# Patient Record
Sex: FEMALE | Race: BLACK OR AFRICAN AMERICAN | NOT HISPANIC OR LATINO | Employment: UNEMPLOYED | URBAN - METROPOLITAN AREA
[De-identification: names, ages, dates, MRNs, and addresses within clinical notes are randomized per-mention and may not be internally consistent; named-entity substitution may affect disease eponyms.]

---

## 2019-01-22 ENCOUNTER — HOSPITAL ENCOUNTER (OUTPATIENT)
Dept: MEDSURG UNIT | Facility: HOSPITAL | Age: 24
End: 2019-01-24
Attending: INTERNAL MEDICINE | Admitting: INTERNAL MEDICINE

## 2019-01-22 LAB
ABS NEUT (OLG): 3.25 X10(3)/MCL (ref 2.1–9.2)
ALBUMIN SERPL-MCNC: 4 GM/DL (ref 3.4–5)
ALBUMIN/GLOB SERPL: 0.91 RATIO (ref 1.1–2)
ALP SERPL-CCNC: 107 UNIT/L (ref 45–117)
ALT SERPL-CCNC: 20 UNIT/L (ref 12–78)
APPEARANCE, UA: CLEAR
AST SERPL-CCNC: 17 UNIT/L (ref 15–37)
BACTERIA #/AREA URNS AUTO: ABNORMAL /[HPF]
BASOPHILS # BLD AUTO: 0.02 X10(3)/MCL
BASOPHILS NFR BLD AUTO: 0 %
BILIRUB SERPL-MCNC: 0.4 MG/DL (ref 0.2–1)
BILIRUB UR QL STRIP: 0.5 MG/DL
BILIRUBIN DIRECT+TOT PNL SERPL-MCNC: 0.1 MG/DL
BILIRUBIN DIRECT+TOT PNL SERPL-MCNC: 0.3 MG/DL
BUN SERPL-MCNC: 12 MG/DL (ref 7–18)
CALCIUM SERPL-MCNC: 9.2 MG/DL (ref 8.5–10.1)
CHLORIDE SERPL-SCNC: 102 MMOL/L (ref 98–107)
CO2 SERPL-SCNC: 27 MMOL/L (ref 21–32)
COLOR UR: YELLOW
CREAT SERPL-MCNC: 1 MG/DL (ref 0.6–1.3)
EOSINOPHIL # BLD AUTO: 0.02 10*3/UL
EOSINOPHIL NFR BLD AUTO: 0 %
ERYTHROCYTE [DISTWIDTH] IN BLOOD BY AUTOMATED COUNT: 12.3 % (ref 11.5–14.5)
GLOBULIN SER-MCNC: 4.4 GM/ML (ref 2.3–3.5)
GLUCOSE (UA): NORMAL
GLUCOSE SERPL-MCNC: 89 MG/DL (ref 74–106)
HCT VFR BLD AUTO: 39.6 % (ref 35–46)
HGB BLD-MCNC: 12.9 GM/DL (ref 12–16)
HGB UR QL STRIP: NEGATIVE
HYALINE CASTS #/AREA URNS LPF: ABNORMAL /[LPF]
IMM GRANULOCYTES # BLD AUTO: 0.05 10*3/UL
IMM GRANULOCYTES NFR BLD AUTO: 1 %
KETONES UR QL STRIP: 20 MG/DL
LEUKOCYTE ESTERASE UR QL STRIP: 25 LEU/UL
LYMPHOCYTES # BLD AUTO: 2.07 X10(3)/MCL
LYMPHOCYTES NFR BLD AUTO: 35 % (ref 13–40)
MAGNESIUM SERPL-MCNC: 2.1 MG/DL (ref 1.8–2.4)
MCH RBC QN AUTO: 29.7 PG (ref 26–34)
MCHC RBC AUTO-ENTMCNC: 32.6 GM/DL (ref 31–37)
MCV RBC AUTO: 91.2 FL (ref 80–100)
MONOCYTES # BLD AUTO: 0.57 X10(3)/MCL
MONOCYTES NFR BLD AUTO: 10 % (ref 4–12)
NEUTROPHILS # BLD AUTO: 3.25 X10(3)/MCL
NEUTROPHILS NFR BLD AUTO: 54 X10(3)/MCL
NITRITE UR QL STRIP: NEGATIVE
PH UR STRIP: 6 [PH] (ref 4.5–8)
PLATELET # BLD AUTO: 274 X10(3)/MCL (ref 130–400)
PMV BLD AUTO: 10.2 FL (ref 7.4–10.4)
POTASSIUM SERPL-SCNC: 4 MMOL/L (ref 3.5–5.1)
PROT SERPL-MCNC: 8.4 GM/DL (ref 6.4–8.2)
PROT UR QL STRIP: 70 MG/DL
RBC # BLD AUTO: 4.34 X10(6)/MCL (ref 4–5.2)
RBC #/AREA URNS AUTO: ABNORMAL /[HPF]
SODIUM SERPL-SCNC: 136 MMOL/L (ref 136–145)
SP GR UR STRIP: 1.04 (ref 1–1.03)
SQUAMOUS #/AREA URNS LPF: >100 /[LPF]
TSH SERPL-ACNC: 0.46 MIU/L (ref 0.36–3.74)
UROBILINOGEN UR STRIP-ACNC: 3 MG/DL
WBC # SPEC AUTO: 6 X10(3)/MCL (ref 4.5–11)
WBC #/AREA URNS AUTO: ABNORMAL /HPF

## 2019-01-23 LAB
AMPHET UR QL SCN: POSITIVE
BARBITURATE SCN PRESENT UR: NEGATIVE
BENZODIAZ UR QL SCN: NEGATIVE
CANNABINOIDS UR QL SCN: POSITIVE
CK MB SERPL-MCNC: <1 NG/ML (ref 1–3.6)
CK SERPL-CCNC: 186 UNIT/L (ref 26–192)
COCAINE UR QL SCN: NEGATIVE
OPIATES UR QL SCN: NEGATIVE
PCP UR QL: NEGATIVE
PH UR STRIP.AUTO: 6 [PH] (ref 5–8)
TEMPERATURE, URINE (OHS): 20 DEGC (ref 20–25)
TROPONIN I SERPL-MCNC: <0.015 NG/ML (ref 0–0.05)

## 2021-10-23 ENCOUNTER — HISTORICAL (OUTPATIENT)
Dept: ADMINISTRATIVE | Facility: HOSPITAL | Age: 26
End: 2021-10-23

## 2021-10-23 LAB
ALBUMIN SERPL-MCNC: 3.2 GM/DL (ref 3.5–5)
ALBUMIN/GLOB SERPL: 0.9 RATIO (ref 1.1–2)
ALP SERPL-CCNC: 86 UNIT/L (ref 40–150)
ALT SERPL-CCNC: 16 UNIT/L (ref 0–55)
AMPHET UR QL SCN: NEGATIVE
APPEARANCE, UA: CLEAR
AST SERPL-CCNC: 13 UNIT/L (ref 5–34)
B-HCG SERPL QL: NEGATIVE
BACTERIA SPEC CULT: ABNORMAL
BARBITURATE SCN PRESENT UR: NEGATIVE
BENZODIAZ UR QL SCN: NEGATIVE
BILIRUB SERPL-MCNC: 0.2 MG/DL
BILIRUB UR QL STRIP: NEGATIVE
BILIRUBIN DIRECT+TOT PNL SERPL-MCNC: <0.1 MG/DL (ref 0–0.5)
BILIRUBIN DIRECT+TOT PNL SERPL-MCNC: >0.1 MG/DL (ref 0–0.8)
BUN SERPL-MCNC: 8.9 MG/DL (ref 7–18.7)
CALCIUM SERPL-MCNC: 9.2 MG/DL (ref 8.4–10.2)
CANNABINOIDS UR QL SCN: POSITIVE NG/ML
CHLORIDE SERPL-SCNC: 106 MMOL/L (ref 98–107)
CHOLEST SERPL-MCNC: 194 MG/DL
CHOLEST/HDLC SERPL: 5 {RATIO} (ref 0–5)
CO2 SERPL-SCNC: 19 MMOL/L (ref 22–29)
COCAINE UR QL SCN: POSITIVE
COLOR UR: YELLOW
CREAT SERPL-MCNC: 0.78 MG/DL (ref 0.55–1.02)
ERYTHROCYTE [DISTWIDTH] IN BLOOD BY AUTOMATED COUNT: 13.1 % (ref 11.5–17)
GLOBULIN SER-MCNC: 3.4 GM/DL (ref 2.4–3.5)
GLUCOSE (UA): NEGATIVE
GLUCOSE SERPL-MCNC: 99 MG/DL (ref 74–100)
HCT VFR BLD AUTO: 38.3 % (ref 37–47)
HDLC SERPL-MCNC: 38 MG/DL (ref 35–60)
HGB BLD-MCNC: 12.2 GM/DL (ref 12–16)
HGB UR QL STRIP: NEGATIVE
KETONES UR QL STRIP: NEGATIVE
LDLC SERPL CALC-MCNC: 119 MG/DL (ref 50–140)
LEUKOCYTE ESTERASE UR QL STRIP: NEGATIVE
MCH RBC QN AUTO: 30.3 PG (ref 27–31)
MCHC RBC AUTO-ENTMCNC: 31.9 GM/DL (ref 33–36)
MCV RBC AUTO: 95.3 FL (ref 80–94)
NITRITE UR QL STRIP: NEGATIVE
OPIATES UR QL SCN: NEGATIVE
PCP UR QL: NEGATIVE
PH UR STRIP.AUTO: 7 [PH] (ref 5–7.5)
PH UR STRIP: 7 [PH] (ref 5–9)
PLATELET # BLD AUTO: 238 X10(3)/MCL (ref 130–400)
PMV BLD AUTO: 10.5 FL (ref 9.4–12.4)
POTASSIUM SERPL-SCNC: 3.8 MMOL/L (ref 3.5–5.1)
PROT SERPL-MCNC: 6.6 GM/DL (ref 6.4–8.3)
PROT UR QL STRIP: NEGATIVE
RBC # BLD AUTO: 4.02 X10(6)/MCL (ref 4.2–5.4)
RBC #/AREA URNS HPF: ABNORMAL /[HPF]
SODIUM SERPL-SCNC: 134 MMOL/L (ref 136–145)
SP GR FLD REFRACTOMETRY: 1.02 (ref 1–1.03)
SP GR UR STRIP: 1.02 (ref 1–1.03)
SQUAMOUS EPITHELIAL, UA: ABNORMAL
TRIGL SERPL-MCNC: 185 MG/DL (ref 37–140)
TSH SERPL-ACNC: 0.73 UIU/ML (ref 0.35–4.94)
UROBILINOGEN UR STRIP-ACNC: 1
VLDLC SERPL CALC-MCNC: 37 MG/DL
WBC # SPEC AUTO: 7.1 X10(3)/MCL (ref 4.5–11.5)
WBC #/AREA URNS HPF: ABNORMAL /[HPF]

## 2022-03-16 ENCOUNTER — HISTORICAL (OUTPATIENT)
Dept: ADMINISTRATIVE | Facility: HOSPITAL | Age: 27
End: 2022-03-16

## 2022-03-16 LAB
ALBUMIN SERPL-MCNC: 3 G/DL (ref 3.5–5)
ALBUMIN/GLOB SERPL: 1 {RATIO} (ref 1.1–2)
ALP SERPL-CCNC: 89 U/L (ref 40–150)
ALT SERPL-CCNC: 7 U/L (ref 0–55)
AMPHET UR QL SCN: NEGATIVE
APPEARANCE, UA: CLEAR
AST SERPL-CCNC: 10 U/L (ref 5–34)
BACTERIA SPEC CULT: NORMAL
BARBITURATE SCN PRESENT UR: NEGATIVE
BENZODIAZ UR QL SCN: NEGATIVE
BILIRUB SERPL-MCNC: 0.3 MG/DL
BILIRUB UR QL STRIP: NEGATIVE
BILIRUBIN DIRECT+TOT PNL SERPL-MCNC: 0.1 (ref 0–0.5)
BILIRUBIN DIRECT+TOT PNL SERPL-MCNC: 0.2 (ref 0–0.8)
BUN SERPL-MCNC: 13 MG/DL (ref 7–18.7)
CALCIUM SERPL-MCNC: 8.7 MG/DL (ref 8.7–10.5)
CANNABINOIDS UR QL SCN: POSITIVE
CHLORIDE SERPL-SCNC: 108 MMOL/L (ref 98–107)
CO2 SERPL-SCNC: 22 MMOL/L (ref 22–29)
COCAINE UR QL SCN: NEGATIVE
COLOR UR: YELLOW
CREAT SERPL-MCNC: 0.77 MG/DL (ref 0.55–1.02)
ERYTHROCYTE [DISTWIDTH] IN BLOOD BY AUTOMATED COUNT: 12.9 % (ref 11.5–17)
GLOBULIN SER-MCNC: 2.9 G/DL (ref 2.4–3.5)
GLUCOSE (UA): NEGATIVE
GLUCOSE SERPL-MCNC: 123 MG/DL (ref 74–100)
HCT VFR BLD AUTO: 38.2 % (ref 37–47)
HEMOLYSIS INTERF INDEX SERPL-ACNC: 2
HGB BLD-MCNC: 11.9 G/DL (ref 12–16)
HGB UR QL STRIP: NEGATIVE
ICTERIC INTERF INDEX SERPL-ACNC: 0
KETONES UR QL STRIP: NEGATIVE
LEUKOCYTE ESTERASE UR QL STRIP: NEGATIVE
LIPEMIC INTERF INDEX SERPL-ACNC: 12
LITHIUM SERPL-MCNC: <0.1 UG/ML (ref 0.5–1.2)
MCH RBC QN AUTO: 29.4 PG (ref 27–31)
MCHC RBC AUTO-ENTMCNC: 31.2 G/DL (ref 33–36)
MCV RBC AUTO: 94.3 FL (ref 80–94)
MUCOUS THREADS URNS QL MICRO: NORMAL
NITRITE UR QL STRIP: NEGATIVE
OPIATES UR QL SCN: NEGATIVE
PCP UR QL: NEGATIVE
PH UR STRIP.AUTO: 6 [PH] (ref 5–7.5)
PH UR STRIP: 6 [PH] (ref 5–9)
PLATELET # BLD AUTO: 252 10*3/UL (ref 130–400)
PMV BLD AUTO: 10.7 FL (ref 9.4–12.4)
POTASSIUM SERPL-SCNC: 4 MMOL/L (ref 3.5–5.1)
PROT SERPL-MCNC: 5.9 G/DL (ref 6.4–8.3)
PROT UR QL STRIP: NEGATIVE
RBC # BLD AUTO: 4.05 10*6/UL (ref 4.2–5.4)
RBC #/AREA URNS HPF: NORMAL /[HPF] (ref 0–2)
SODIUM SERPL-SCNC: 138 MMOL/L (ref 136–145)
SP GR FLD REFRACTOMETRY: >1.03 (ref 1–1.03)
SP GR UR STRIP: >=1.03 (ref 1–1.03)
SQUAMOUS EPITHELIAL, UA: NORMAL
TSH SERPL-ACNC: 1.07 M[IU]/L (ref 0.35–4.94)
UROBILINOGEN UR STRIP-ACNC: 1
WBC # SPEC AUTO: 6.4 10*3/UL (ref 4.5–11.5)
WBC #/AREA URNS HPF: NORMAL /[HPF] (ref 0–2)

## 2022-04-30 NOTE — DISCHARGE SUMMARY
Patient:   Jessica Villalta            MRN: [id number]            FIN: [id number]               Age:   23 years     Sex:  Female     :  1995   Associated Diagnoses:   None   Author:   Mary ALLEN, Dg LINDO      INTERNAL MEDICINE DISCHARGE SUMMARY        Patient:  Jessica Villalta  YOB: 1995  MRN: [id number]    Admit Date: 2019  Discharge Date: 2019  Attending Physician - Juan Carlos ALLEN, Clotilde DE DIOS  Admitting Physician - Clotilde Rangel MD  Consulting Physician - Clotilde Rangel MD  Primary Care Physician - No PCP, No           Condition on Discharge:  Clinically and hemodynamically stable.      Final Diagnosis:  Schizophrenia, bipolar, somatoform disorder           Procedures: EEG       History of Present Illness:    22 y/o female with PMH schizophrenia, bipolar disorder presents to ED from [facility] with c/o seizure like activity that occured prior to arriving in ED. She states she lost conciousness during the episode and remained confused for about 30 mins after, however the event was witnessed by her cellmate who claims that she collapsed on the floor and began shaking for about 20 mins. There was no urinary or bowel incontinence during or after the event. However she states weakness to her lower extremeties that began prior to the seizure and that she currently cannot lift her legs; but she did walk into the ED under her own power withwout any difficulty. The patient states she had seizures when she was a child and reports being on medicine at that time, but she cannot remember what medicine or when she stopped taking it. She also reports vomiting x5 episodes prior to her seizure, and also using methamphetamine for the first time yesterday as well. She also reprots R sided sharp 2/10 chest pain that is non-radiating and alleviated with ibuprofen and aggravated with palpation. Denies SOB, HA, trauma, tounge biting, confusion.      Physical Exam:    General: Alert and oriented, no  "acute distress  Respiratory: Lungs clear to auscultation bilaterally, no wheezes, no increased work of  Cardiovascular: S1-S2, regular rate, regular rhythm, 2+ radial pulses  Gastrointestinal: Obese, soft, nontender  Musculoskeletal: Appropriate movement of extremities bilaterally without weakness  Integumentary: No rashes or breakdown noted  Neurologic: No focal neurological defects no  Psych: Calm, cooperative    Laboratory Data:    Labs Last 24 Hours   No qualifying data available.                Hospital Course:    Patient was sent to the hospital from [facility] due to "seizure".  Upon presentation to the emergency department after patient walked into the emergency room she no longer could "move her legs".  An EEG was performed to rule out seizures which was negative.  Throughout the hospitalization stay patient was released from [facility].  A psychiatry consult was placed and they recommended outpatient therapy and restarting long-acting mood stabilizer as well as continuing home medications.  Patient states she has not had her medications in approximately 6 months.         Discharge Instructions:               Diet: Regular             Activity: As tolerated             Disposition:  Clinically and hemodynamically stable.      Continue  QUEtiapine (Seroquel 100 mg oral tablet) 100 mg, Oral, At Bedtime  mirtazapine (Remeron 15 mg oral tablet) 15 mg, Oral, Once a day (at bedtime)  paliperidone (Invega Sustenna 156 Mg Pref Sy), IM, qMonth  paliperidone (Invega Sustenna 234 Mg Pref Sy), IM, Once  sertraline (Zoloft 50 mg oral tablet) 50 mg, Oral, Daily  Discontinue  ondansetron (Zofran ODT 4 mg oral tablet, disintegrating) 4 mg, Oral, q8hr      Follow-Up Appointments:    [facility], within 1 week   Call for an appointment in 1 to 2 days  [facility], within 1 to 2 weeks  Anytime the conditions worsen, return to clinic or go to ED      Code Status: FULL CODE     Dg Hernandez MD - " HO1  [facility]

## 2022-04-30 NOTE — ED PROVIDER NOTES
Patient:   Jessica Villalta            MRN: [id number]            FIN: [id number]               Age:   23 years     Sex:  Female     :  1995   Associated Diagnoses:   New onset seizure; Chest wall pain   Author:   Dino Carlin MD      Basic Information   Time seen: Date & time 2019 22:58:00.   History source: Patient.   Arrival mode: Ambulance.   History limitation: None.   Additional information: Chief Complaint from Nursing Triage Note : Chief Complaint   2019 22:49 CST      Chief Complaint           chest  pain  now   and  said  cell  mate  saw  her  have  a  seizure.  was  shaking  today  .      History of Present Illness   The patient presents with chest pain.  The onset was just prior to arrival.  The course/duration of symptoms is constant.  Location: Right superior chest. Radiating pain: none. The character of symptoms is sharp.  The degree at present is 2 /10.  The exacerbating factor is palpation.  The relieving factor is none.  Risk factors consist of none.  Prior episodes: none.  Therapy today None.  Associated symptoms: none.     The patient presents with patient reports seizure like activity, states taht she was at the toilet, then next thing she remembers is her cellmate yelling for her, getting up off the floor.  cellmate reported to patient whole body shaking, lasted about 2 minutes..        Review of Systems   Constitutional symptoms:  No fever, no chills, no sweats, no weakness, no fatigue.    Skin symptoms:  No rash,    Eye symptoms:  No recent vision problems,    ENMT symptoms:  No sore throat,    Respiratory symptoms:  No shortness of breath, no cough.    Cardiovascular symptoms:  No chest pain, no palpitations, no tachycardia, no syncope, no diaphoresis.    Gastrointestinal symptoms:  No abdominal pain, no nausea, no vomiting, no diarrhea, no constipation.    Genitourinary symptoms:  No dysuria,    Musculoskeletal symptoms:  No back pain, no Muscle pain.     Neurologic symptoms:  No headache, no dizziness.              Additional review of systems information: All other systems reviewed and otherwise negative.      Health Status   Allergies:    Allergic Reactions (Selected)  No Known Allergies,    Allergies (1) Active Reaction  No Known Allergies None Documented.   Medications:  (Selected)   Prescriptions  Prescribed  Remeron 15 mg oral tablet: 15 mg = 1 tab(s), Oral, Once a day (at bedtime), # 30 tab(s), 0 Refill(s)  Seroquel 100 mg oral tablet: 100 mg = 1 tab(s), Oral, At Bedtime, # 30 tab(s), 0 Refill(s)  Zofran ODT 4 mg oral tablet, disintegratin mg = 1 tab(s), Oral, q8hr, as needed for nausea, # 10 tab(s), 0 Refill(s)  Zoloft 50 mg oral tablet: 50 mg = 1 tab(s), Oral, Daily, # 30 tab(s), 0 Refill(s)  Documented Medications  Documented  Invega Sustenna 156 Mg Pref Sy: IM, qMonth  Invega Sustenna 234 Mg Pref Sy: IM, Once.      Past Medical/ Family/ Social History   Medical history:    Resolved  Pregnant ([id number]): Onset on 2015 at 20 years.  Resolved.  Pregnant ([id number]):  Resolved in  at 13 years.  Pregnant ([id number]):  Resolved in  at 13 years.  Pregnant ([id number]):  Resolved in  at 14 years.  Pregnant ([id number]):  Resolved in  at 18 years.  Deficient knowledge ([id number]):  Resolved..   Surgical history:    ..   Family history:    Entire family history is negative..   Problem list:    Active Problems (5)  Hypertension   Ineffective coping   Knowledge deficit   Obesity   Tobacco user .      Physical Examination               Vital Signs      Vital Signs (last 24 hrs)_____  Last Charted___________  Temp Oral     36.7 DegC  (:49)  Heart Rate Peripheral   81 bpm  (:49)  Resp Rate         20 br/min  (:)  SBP      H 142mmHg  (:49)  DBP      H 102mmHg  (:49)  SpO2      97 %  (:49).   General:  Alert, no acute distress.    Skin:  Warm, dry.     Head:  Normocephalic.   Neck:  Supple, trachea midline, no tenderness, no JVD.    Eye:  Pupils are equal, round and reactive to light, extraocular movements are intact.    Ears, nose, mouth and throat:  Oral mucosa moist.   Cardiovascular:  Regular rate and rhythm, No murmur, Normal peripheral perfusion, No edema.    Respiratory:  Lungs are clear to auscultation, respirations are non-labored, breath sounds are equal, Symmetrical chest wall expansion.    Gastrointestinal:  Soft, Nontender, Non distended, Normal bowel sounds, No organomegaly.    Back:  Normal range of motion.   Musculoskeletal:  Normal ROM, normal strength, no tenderness, no swelling, no deformity.    Neurological:  Alert and oriented to person, place, time, and situation, No focal neurological deficit observed, CN II-XII intact, normal sensory observed, normal motor observed, normal speech observed, normal coordination observed.       Medical Decision Making   Documents reviewed:  Emergency department nurses' notes, emergency department records, prior records.    Electrocardiogram:  Time 1/22/2019 22:56:00, rate 72, normal sinus rhythm, No ST-T changes, no ectopy, normal DE & QRS intervals, EP Interp.    Results review:  Lab results : Lab View   1/22/2019 23:24 CST      U beta hCG Ql POC         Negative    1/22/2019 23:15 CST      Sodium Lvl                136 mmol/L                             Potassium Lvl             4.0 mmol/L                             Chloride                  102 mmol/L                             CO2                       27 mmol/L                             Calcium Lvl               9.2 mg/dL                             Glucose Lvl               89 mg/dL                             BUN                       12 mg/dL                             Creatinine                1.00 mg/dL                             eGFR-AA                   87 mL/min  LOW                             eGFR-JENSEN                  72 mL/min  LOW                              Bili Total                0.4 mg/dL                             Bili Direct               0.1 mg/dL                             Bili Indirect             0.3 mg/dL                             AST                       17 unit/L                             ALT                       20 unit/L                             Alk Phos                  107 unit/L                             Total Protein             8.4 gm/dL  HI                             Albumin Lvl               4.0 gm/dL                             Globulin                  4.40 gm/mL  HI                             A/G Ratio                 0.91 ratio  LOW                             WBC                       6.0 x10(3)/mcL                             RBC                       4.34 x10(6)/mcL                             Hgb                       12.9 gm/dL                             Hct                       39.6 %                             Platelet                  274 x10(3)/mcL                             MCV                       91.2 fL                             MCH                       29.7 pg                             MCHC                      32.6 gm/dL                             RDW                       12.3 %                             MPV                       10.2 fL                             Abs Neut                  3.25 x10(3)/mcL                             Neutro Auto               54 x10(3)/mcL  NA                             Lymph Auto                35 %                             Mono Auto                 10 %                             Eos Auto                  0  NA                             Abs Eos                   0.02  NA                             Basophil Auto             0  NA                             Abs Neutro                3.25 x10(3)/mcL  NA                             Abs Lymph                 2.07 x10(3)/mcL  NA                             Abs Mono                  0.57  x10(3)/mcL  NA                             Abs Baso                  0.02 x10(3)/mcL  NA                             IG%                       1 %  NA                             IG#                       0.0500  NA    1/22/2019 23:04 CST      UA Appear                 Clear                             UA Color                  YELLOW                             UA Spec Grav              1.040  HI                             UA Bili                   0.5 mg/dL                             UA pH                     6.0                             UA Urobilinogen           3 mg/dL                             UA Blood                  Negative                             UA Glucose                Normal                             UA Ketones                20 mg/dL                             UA Protein                70 mg/dL                             UA Nitrite                Negative                             UA Leuk Est               25 Gustabo/uL                             UA WBC Interp             0-2 /HPF                             UA RBC Interp             0-2                             UA Bact Interp            Rare                             UA Squam Epi Interp       >100                             UA Hyal Cast Interp       3-5  .   Radiology results:  01/23/2019 0:33; Dino Carlin MD; Negative; No infiltrate, mass or other acute cardiopulmonary abnormality 01/23/2019 0:33; Dino Carlin MD; Negative; No intracranial hemorrhage or other acute abnormality.   Notes:  lab results and imaging reviewed. patient monitored in the ER, witnessed seizure in long-term. .      Reexamination/ Reevaluation   Vital signs   results included from flowsheet : Vital Signs   1/23/2019 0:25 CST       Peripheral Pulse Rate     74 bpm                             Respiratory Rate          16 br/min                             SpO2                      100 %                             Oxygen Therapy            Room air                              Systolic Blood Pressure   129 mmHg                             Diastolic Blood Pressure  95 mmHg  HI                             Mean Arterial Pressure, Cuff              106 mmHg    1/22/2019 22:49 CST      Temperature Oral          36.7 DegC                             Temperature Oral (calculated)             98.06 DegF                             Peripheral Pulse Rate     81 bpm                             Respiratory Rate          20 br/min                             SpO2                      97 %                             Oxygen Therapy            Room air                             Systolic Blood Pressure   142 mmHg  HI                             Diastolic Blood Pressure  102 mmHg  HI        Impression and Plan   Diagnosis   New onset seizure (PRD54-NO R56.9)   Chest wall pain (PDE53-JD R07.89)      Calls-Consults   -  1/23/2019 00:34:00 , Jerald ALLEN, Jamil FOFANA  on call, consult.    Plan   Condition: Stable, Guarded.    Disposition: Admit time  1/23/2019 00:34:00, Place in Observation Unit.

## 2022-04-30 NOTE — H&P
Patient:   Jessica Villalta            MRN: [id number]            FIN: [id number]               Age:   23 years     Sex:  Female     :  1995   Associated Diagnoses:   None   Author:   Jamil Marques MD      Chief Complaint: Nausea and seizure like actiivity       History of Present Illness  22 y/o female with PMH schizophrenia, bipolar disorder presents to ED from [facility] with c/o seizure like activity that occured prior to arriving in ED. She states she lost conciousness during the episode and remained confused for about 30 mins after, however the event was witnessed by her cellmate who claims that she collapsed on the floor and began shaking for about 20 mins. There was no urinary or bowel incontinence during or after the event. However she states weakness to her lower extremeties that began prior to the seizure and that she currently cannot lift her legs; but she did walk into the ED under her own power withwout any difficulty. The patient states she had seizures when she was a child and reports being on medicine at that time, but she cannot remember what medicine or when she stopped taking it. She also reports vomiting x5 episodes prior to her seizure, and also using methamphetamine for the first time yesterday as well. She also reprots R sided sharp 2/10 chest pain that is non-radiating and alleviated with ibuprofen and aggravated with palpation. Denies SOB, HA, trauma, tounge biting, confusion.  ED course: VSS on arrival. CT head shows no acute hemorrhage or infarct. Labs drawn show no abnormaltites. Medicine consulted for admission for seizure workup.        Past Medical History: Schizophrenia, Bipolar disorder      Past Surgical History: C section    Family History: Liver cancer in uncle    Social History: Current smoker 1PPD x10 years, denies etoh abuse, occasional illicit drug use (states tried methamphetamine for the first time yesterday)    Allergies: NKDA    Home  Medications:  Home Medications (6) Active  Invega Sustenna 156 Mg Pref Sy , IM, qMonth  Invega Sustenna 234 Mg Pref Sy , IM, Once  Remeron 15 mg oral tablet 15 mg = 1 tab(s), Oral, Once a day (at bedtime)  Seroquel 100 mg oral tablet 100 mg = 1 tab(s), Oral, At Bedtime  Zofran ODT 4 mg oral tablet, disintegrating 4 mg = 1 tab(s), Oral, q8hr  Zoloft 50 mg oral tablet 50 mg = 1 tab(s), Oral, Daily    Review of Systems  Constitutional: no fevers, night sweats, chills or fatigue  HEENT: no acute vision changes or photophobia, no hearing loss  CVS: +chest pain. no palpitations, or orthopnea  Resp: no SOB, no cough, or wheezing  GI: no abd pain, nausea, vomiting or diarrhea, dark stools  : no dysuria, urinary incontinence  Musculoskeletal: +b/l LE weakness. No joint stiffness, pain, swelling  Skin: no rashes, lesions  Neuro: +seizure, +confusion, no headaches,  no numbness  Psych: no depression or anxiety    Physical Examination  Vital Signs (last 24 hrs)_____  Last Charted___________  Temp Oral     36.7 DegC  (JAN 22 22:49)  Heart Rate Peripheral   74 bpm  (JAN 23 00:25)  Resp Rate         16 br/min  (JAN 23 00:25)  SBP      129 mmHg  (JAN 23 00:25)  DBP      H 95mmHg  (JAN 23 00:25)  SpO2      100 %  (JAN 23 00:25)       General: AAOx3, NAD, alert and cooperative, patient lying comfortably in bed with leg cuffed to railing  HEENT: PERRLA, EOMI  Neck: no LAD, no JVD, supple, no masses or thyromegaly  CVS: S1/S2 nml, RRR, no murmurs, rubs or gallops, no carotid bruits  Resp: CTA b/l, no wheezing  GI: Not distended, not tender, BS+, no guarding  Skin: not jaundiced, warm, no rashes  Musculoskeletal: normal ROM, no joint tenderness. Strength 5/5 in b/l UE, strength 2/5 in b/l LE with difficulty raising legs and dorsiflexing/plantar flexing feet. Patient reports being able to walk without difficulty.  Extremities: No edema, peripheral pulses intact  Neuro: CN II-XII grossly intact, strength and sensation symmetric and  intact throughout, no focal neurological deficits    Labs  Labs Last 24 Hours   Chemistry  Hematology/Coagulation    Sodium Lvl: 136 mmol/L (01/22/19 23:15:41) WBC: 6 x10(3)/mcL (01/22/19 23:15:41)   Potassium Lvl: 4 mmol/L (01/22/19 23:15:41) RBC: 4.34 x10(6)/mcL (01/22/19 23:15:41)   Chloride: 102 mmol/L (01/22/19 23:15:41) Hgb: 12.9 gm/dL (01/22/19 23:15:41)   CO2: 27 mmol/L (01/22/19 23:15:41) Hct: 39.6 % (01/22/19 23:15:41)   Calcium Lvl: 9.2 mg/dL (01/22/19 23:15:41) Platelet: 274 x10(3)/mcL (01/22/19 23:15:41)   Magnesium Lvl: 2.1 mg/dL (01/22/19 23:15:00) MCV: 91.2 fL (01/22/19 23:15:41)   Glucose Lvl: 89 mg/dL (01/22/19 23:15:41) MCH: 29.7 pg (01/22/19 23:15:41)   BUN: 12 mg/dL (01/22/19 23:15:41) MCHC: 32.6 gm/dL (01/22/19 23:15:41)   Creatinine: 1 mg/dL (01/22/19 23:15:41) RDW: 12.3 % (01/22/19 23:15:41)   eGFR-AA: 87 mL/min Low (01/22/19 23:15:41) MPV: 10.2 fL (01/22/19 23:15:41)   eGFR-JENSEN: 72 mL/min Low (01/22/19 23:15:41) Abs Neut: 3.25 x10(3)/mcL (01/22/19 23:15:41)   Bili Total: 0.4 mg/dL (01/22/19 23:15:41) Neutro Auto: 54 x10(3)/mcL (01/22/19 23:15:41)   Bili Direct: 0.1 mg/dL (01/22/19 23:15:41) Lymph Auto: 35 % (01/22/19 23:15:41)   Bili Indirect: 0.3 mg/dL (01/22/19 23:15:41) Mono Auto: 10 % (01/22/19 23:15:41)   AST: 17 unit/L (01/22/19 23:15:41) Eos Auto: 0 (01/22/19 23:15:41)   ALT: 20 unit/L (01/22/19 23:15:41) Abs Eos: 0.02 (01/22/19 23:15:41)   Alk Phos: 107 unit/L (01/22/19 23:15:41) Basophil Auto: 0 (01/22/19 23:15:41)   Total Protein: 8.4 gm/dL High (01/22/19 23:15:41) Abs Neutro: 3.25 x10(3)/mcL (01/22/19 23:15:41)   Albumin Lvl: 4 gm/dL (01/22/19 23:15:41) Abs Lymph: 2.07 x10(3)/mcL (01/22/19 23:15:41)   Globulin: 4.4 gm/mL High (01/22/19 23:15:41) Abs Mono: 0.57 x10(3)/mcL (01/22/19 23:15:41)   A/G Ratio: 0.91 ratio Low (01/22/19 23:15:41) Abs Baso: 0.02 x10(3)/mcL (01/22/19 23:15:41)   TSH: 0.463 mIU/L (01/22/19 23:15:00) IG%: 1 % (01/22/19 23:15:41)    IG#: 0.05 (01/22/19  23:15:41)         Radiology  CT head: no acute hemorrhage, no acute intracranial pathology    Assessment and Plan    1) Siezure vs pseudoseizure  - States she has had seizures in the past, however the is no record of these events and she is unable to answer questions regarding therapy, duration or therapy, or types of seziures she had  - Last event was witnessed by cellmate, lasted for 20 mins followed by about 30 mins of post-ictal confusion  - Not associated with urinary or bowel incontinence  - Will order EEG in AM. Will need neuro followup as outpatient    2) H/o Schizophrenia  - Not on any medications at this time, and is stable     3) h/o bipolar disorder  - Was on seroquel, but is unable to receive this medication in longterm    4) b/l LE weakness  - Strength 2/5 on exam, and patient states she is unable to move her legs without considerable effort however  states she walked into ED without any difficulty  - Patient states this began prior to the seizure like event  - No electrolyte disturbances noted on labs  - Continue to monitor    Prophylaxis: Lovenox    Disposition: Will admit to medical unit for observation for supposedly first time seizure. EEG scheduled in AM. Consider neuro outpatient followup.

## 2022-11-18 ENCOUNTER — HOSPITAL ENCOUNTER (EMERGENCY)
Facility: HOSPITAL | Age: 27
Discharge: HOME | End: 2022-11-18
Attending: FAMILY MEDICINE
Payer: MEDICAID

## 2022-11-18 VITALS
HEIGHT: 66 IN | BODY MASS INDEX: 37.21 KG/M2 | DIASTOLIC BLOOD PRESSURE: 86 MMHG | WEIGHT: 231.5 LBS | TEMPERATURE: 98 F | OXYGEN SATURATION: 99 % | RESPIRATION RATE: 20 BRPM | SYSTOLIC BLOOD PRESSURE: 152 MMHG | HEART RATE: 74 BPM

## 2022-11-18 DIAGNOSIS — R11.2 NAUSEA AND VOMITING, UNSPECIFIED VOMITING TYPE: Primary | ICD-10-CM

## 2022-11-18 PROCEDURE — 99283 EMERGENCY DEPT VISIT LOW MDM: CPT

## 2022-11-18 NOTE — ED PROVIDER NOTES
"Encounter Date: 11/18/2022       History     Chief Complaint   Patient presents with    Vomiting     C/o vomiting, being "overheated " in house, and headache x 1 week     Jessica Villalta is a 27 y.o. female with a history of cannabis hyperemesis syndrome who presents to the ED complaining of nausea and vomiting x 1 week. She last smoked marijuana yesterday. She is requesting zofran prescription. She has no other complaints today. She denies fevers, chills, chest pain, SOB, abdominal pain, diarrhea, dysuria.     The history is provided by the patient. No  was used.   Review of patient's allergies indicates:  No Known Allergies  History reviewed. No pertinent past medical history.  History reviewed. No pertinent surgical history.  History reviewed. No pertinent family history.  Social History     Tobacco Use    Smoking status: Every Day     Packs/day: 0.50     Types: Cigarettes    Smokeless tobacco: Never   Substance Use Topics    Alcohol use: Never    Drug use: Yes     Types: Marijuana     Comment: daily     Review of Systems   Constitutional:  Negative for chills and fever.   HENT:  Negative for congestion and rhinorrhea.    Eyes:  Negative for redness and itching.   Respiratory:  Negative for cough and shortness of breath.    Cardiovascular:  Negative for chest pain and leg swelling.   Gastrointestinal:  Positive for nausea and vomiting. Negative for abdominal pain.   Genitourinary:  Negative for dysuria and frequency.   Musculoskeletal:  Negative for arthralgias and back pain.   Skin:  Negative for rash and wound.   Neurological:  Negative for dizziness and headaches.   Psychiatric/Behavioral:  Negative for agitation and confusion.      Physical Exam     Initial Vitals [11/18/22 1539]   BP Pulse Resp Temp SpO2   (!) 152/86 74 20 97.5 °F (36.4 °C) 99 %      MAP       --         Physical Exam    Nursing note and vitals reviewed.  Constitutional: She appears well-developed and " well-nourished. No distress.   HENT:   Head: Normocephalic and atraumatic.   Mouth/Throat: No oropharyngeal exudate.   Eyes: EOM are normal. No scleral icterus.   Neck: Neck supple. No thyromegaly present.   Normal range of motion.  Cardiovascular:  Normal rate and regular rhythm.           No murmur heard.  Pulmonary/Chest: No respiratory distress. She has no wheezes.   Abdominal: Abdomen is soft. She exhibits no distension. There is no abdominal tenderness.   Musculoskeletal:         General: No edema. Normal range of motion.      Cervical back: Normal range of motion and neck supple.     Neurological: She is alert and oriented to person, place, and time. No cranial nerve deficit.   Skin: Skin is warm and dry. Capillary refill takes less than 2 seconds. No erythema.   Psychiatric: She has a normal mood and affect. Thought content normal.       ED Course   Procedures  Labs Reviewed - No data to display       Imaging Results    None          Medications - No data to display  Medical Decision Making:   ED Management:  Patient eloped prior to labs, treatment.                        Clinical Impression:   Final diagnoses:  [R11.2] Nausea and vomiting, unspecified vomiting type (Primary)        ED Disposition Condition    Eloped Stable                Analisa Alcantar PA-C  11/18/22 1640

## 2023-01-18 ENCOUNTER — HOSPITAL ENCOUNTER (EMERGENCY)
Facility: HOSPITAL | Age: 28
Discharge: HOME | End: 2023-01-18
Attending: EMERGENCY MEDICINE
Payer: MEDICAID

## 2023-01-18 VITALS
RESPIRATION RATE: 18 BRPM | HEIGHT: 66 IN | SYSTOLIC BLOOD PRESSURE: 161 MMHG | OXYGEN SATURATION: 98 % | WEIGHT: 260 LBS | TEMPERATURE: 99 F | HEART RATE: 96 BPM | BODY MASS INDEX: 41.78 KG/M2 | DIASTOLIC BLOOD PRESSURE: 108 MMHG

## 2023-01-18 DIAGNOSIS — F33.9 RECURRENT MAJOR DEPRESSIVE DISORDER, REMISSION STATUS UNSPECIFIED: Primary | ICD-10-CM

## 2023-01-18 PROCEDURE — 99284 EMERGENCY DEPT VISIT MOD MDM: CPT

## 2023-01-18 RX ORDER — TRAZODONE HYDROCHLORIDE 100 MG/1
100 TABLET ORAL NIGHTLY
Qty: 30 TABLET | Refills: 0 | Status: SHIPPED | OUTPATIENT
Start: 2023-01-18

## 2023-01-18 RX ORDER — VENLAFAXINE HYDROCHLORIDE 150 MG/1
150 CAPSULE, EXTENDED RELEASE ORAL DAILY
Qty: 30 CAPSULE | Refills: 0 | Status: SHIPPED | OUTPATIENT
Start: 2023-01-18

## 2023-01-18 RX ORDER — ALPRAZOLAM 0.5 MG/1
0.5 TABLET ORAL DAILY
Qty: 30 TABLET | Refills: 0 | Status: SHIPPED | OUTPATIENT
Start: 2023-01-18 | End: 2023-02-17

## 2023-01-18 NOTE — ED TRIAGE NOTES
C/o out of depression meds and was told by counselors to come to ED. Ran out of meds 2 days ago. Denies SI and Denies HI

## 2023-01-18 NOTE — Clinical Note
"Jessica Chatmankaren Villalta was seen and treated in our emergency department on 1/18/2023.  She may return to work on 01/21/2023.       If you have any questions or concerns, please don't hesitate to call.      Kristi Cannon MD"

## 2023-01-18 NOTE — DISCHARGE INSTRUCTIONS
Continue meeting with your counselor and hopefully you will be able to find a new doctor soon.  You may consider contacting your insurance company to see is covered.  Return immediately to the emergency room should you start feeling suicidal.

## 2023-01-18 NOTE — ED PROVIDER NOTES
Encounter Date: 1/18/2023       History     Chief Complaint   Patient presents with    Depression     C/o out of depression meds and was told by counselors to come to ED. Ran out of meds 2 days ago. Denies SI and Denies HI     See MDM      Review of patient's allergies indicates:  No Known Allergies  No past medical history on file.  No past surgical history on file.  No family history on file.  Social History     Tobacco Use    Smoking status: Every Day     Packs/day: 0.50     Types: Cigarettes    Smokeless tobacco: Never   Substance Use Topics    Alcohol use: Never    Drug use: Yes     Types: Marijuana     Comment: daily     Review of Systems   Psychiatric/Behavioral:  Positive for dysphoric mood.    All other systems reviewed and are negative.    Physical Exam     Initial Vitals [01/18/23 1739]   BP Pulse Resp Temp SpO2   (!) 161/108 96 18 98.6 °F (37 °C) 98 %      MAP       --         Physical Exam    Nursing note and vitals reviewed.  Constitutional: She appears well-developed and well-nourished.   Pulmonary/Chest: No respiratory distress.     Neurological: She is alert and oriented to person, place, and time.   Skin: Capillary refill takes less than 2 seconds.   Psychiatric:   Speach is clear and goal directed, mentation is clear, affect is normal, endorses some depression. She has no suicidal ideation, no thoughts of wanting to be dead, no plan to hurt her self, no hallucinations or homicidal ideation.       ED Course   Procedures  Labs Reviewed - No data to display       Imaging Results    None          Medications - No data to display  Medical Decision Making:   Initial Assessment:   27-year-old female with a history of depression states that she is out of her medications and was told by her counselor to come to the emergency room for another 30 day supply of medications.  She states that she was referred to a psychiatrist but there is a 5 month waiting list to get into see the psychiatrist so her  counselors trying to set her up with a different provider.  Differential Diagnosis:   Differential diagnosis includes, but it's not limited to Depression, suicidal ideation, medication, refill  ED Management:  Patient does not have any suicidal ideation and just needs a refill on her medication. She is stable for discharge. She was speaking to her counselor on the phone when I first enter the room and appears to have good support.                        Clinical Impression:   Final diagnoses:  [F33.9] Recurrent major depressive disorder, remission status unspecified (Primary)        ED Disposition Condition    Discharge Stable          ED Prescriptions       Medication Sig Dispense Start Date End Date Auth. Provider    venlafaxine (EFFEXOR-XR) 150 MG Cp24 Take 1 capsule (150 mg total) by mouth once daily. 30 capsule 1/18/2023 -- Kristi Cannon MD    ALPRAZolam (XANAX) 0.5 MG tablet Take 1 tablet (0.5 mg total) by mouth once daily. 30 tablet 1/18/2023 2/17/2023 Kristi Cannon MD    traZODone (DESYREL) 100 MG tablet Take 1 tablet (100 mg total) by mouth every evening. 30 tablet 1/18/2023 -- Kristi Cannon MD          Follow-up Information       Follow up With Specialties Details Why Contact Info    PCP  Schedule an appointment as soon as possible for a visit                Kristi Cannon MD  01/19/23 3556

## 2023-04-26 ENCOUNTER — HOSPITAL ENCOUNTER (EMERGENCY)
Facility: HOSPITAL | Age: 28
Discharge: LEFT BEFORE DISCHARGE | End: 2023-04-26
Attending: FAMILY MEDICINE
Payer: MEDICAID

## 2023-04-26 VITALS
TEMPERATURE: 98 F | BODY MASS INDEX: 38.27 KG/M2 | HEART RATE: 100 BPM | RESPIRATION RATE: 20 BRPM | OXYGEN SATURATION: 99 % | WEIGHT: 238.13 LBS | DIASTOLIC BLOOD PRESSURE: 99 MMHG | SYSTOLIC BLOOD PRESSURE: 144 MMHG | HEIGHT: 66 IN

## 2023-04-26 DIAGNOSIS — R05.9 COUGH: ICD-10-CM

## 2023-04-26 DIAGNOSIS — M25.551 RIGHT HIP PAIN: ICD-10-CM

## 2023-04-26 DIAGNOSIS — J18.9 COMMUNITY ACQUIRED PNEUMONIA OF LEFT LOWER LOBE OF LUNG: Primary | ICD-10-CM

## 2023-04-26 LAB
B-HCG UR QL: NEGATIVE
CTP QC/QA: YES

## 2023-04-26 PROCEDURE — 81025 URINE PREGNANCY TEST: CPT | Performed by: NURSE PRACTITIONER

## 2023-04-26 PROCEDURE — 99284 EMERGENCY DEPT VISIT MOD MDM: CPT | Mod: 25

## 2023-04-26 NOTE — ED PROVIDER NOTES
Encounter Date: 4/26/2023       History     Chief Complaint   Patient presents with    Cough    Hip Pain     Multiple chronic complaints, but recent hip pain and decreased appetite with nausea.      The patient presents with occas nonprod cough, sore throat, nasal congestion, subjective fever, no cp or sob, no known covid-19 exposure.  The onset was 3  days ago.  The course/duration of symptoms is constant.  The degree at present is minimal.  The exacerbating factor is none.  The relieving factor is none.  Risk factors consist of none.  Prior episodes: occasional.  Associated symptoms: decreased appetite, occasional nausea - none at this time, denies abdominal pain, denies dysuria, denies diarrhea, dry cough, nasal congestion, rhinorrhea, sore throat, denies chest pain and denies shortness of breath.  Additional history: none. Also reports right hip pain for 1 week which is improving. No known injury.    Review of patient's allergies indicates:  No Known Allergies  History reviewed. No pertinent past medical history.  History reviewed. No pertinent surgical history.  History reviewed. No pertinent family history.  Social History     Tobacco Use    Smoking status: Every Day     Packs/day: 0.50     Types: Cigarettes    Smokeless tobacco: Never   Substance Use Topics    Alcohol use: Never    Drug use: Not Currently     Types: Marijuana     Comment: daily     Review of Systems   Constitutional:  Positive for fever.   HENT:  Positive for congestion and sore throat.    Respiratory:  Positive for shortness of breath.    Cardiovascular:  Negative for chest pain.   Gastrointestinal:  Negative for nausea.   Genitourinary:  Negative for dysuria.   Musculoskeletal:  Positive for arthralgias. Negative for back pain.   Skin:  Negative for rash.   Neurological:  Negative for weakness.   Hematological:  Does not bruise/bleed easily.   All other systems reviewed and are negative.    Physical Exam     Initial Vitals [04/26/23 0826]    BP Pulse Resp Temp SpO2   (!) 144/99 100 20 98.1 °F (36.7 °C) 99 %      MAP       --         Physical Exam    Nursing note and vitals reviewed.  Constitutional: She appears well-developed and well-nourished.   HENT:   Head: Normocephalic and atraumatic.   Right Ear: Tympanic membrane normal.   Left Ear: Tympanic membrane normal.   Nose: Nose normal.   Mouth/Throat: Uvula is midline, oropharynx is clear and moist and mucous membranes are normal.   Neck: Neck supple.   Normal range of motion.  Cardiovascular:  Normal rate, regular rhythm, normal heart sounds and intact distal pulses.           Pulmonary/Chest: Breath sounds normal.   Abdominal: Abdomen is soft. Bowel sounds are normal.   Musculoskeletal:         General: Normal range of motion.      Cervical back: Normal range of motion and neck supple.      Comments: Mild ttp right hip, FROM, good distal pulses, NVI     Neurological: She is alert. She has normal strength.   Skin: Skin is warm and dry.   Psychiatric: She has a normal mood and affect.       ED Course   Procedures  Labs Reviewed   STREP GROUP A BY PCR   COVID/FLU A&B PCR   CBC W/ AUTO DIFFERENTIAL    Narrative:     The following orders were created for panel order CBC auto differential.  Procedure                               Abnormality         Status                     ---------                               -----------         ------                     CBC with Differential[[id number]]                                                         Please view results for these tests on the individual orders.   COMPREHENSIVE METABOLIC PANEL   CBC WITH DIFFERENTIAL   POCT URINE PREGNANCY          Imaging Results              X-Ray Chest AP Portable (Final result)  Result time 04/26/23 09:25:42      Final result by Rito Serrano MD (04/26/23 09:25:42)                   Impression:      Left perihilar and basilar opacities suspicious for infection.      Electronically signed by: Rito  Maggie  Date:    04/26/2023  Time:    09:25               Narrative:    EXAMINATION:  XR CHEST AP PORTABLE    CLINICAL HISTORY:  Cough, unspecified    COMPARISON:  25 January 2022    FINDINGS:  Portable frontal view of the chest was obtained. The heart is not enlarged.  There are left perihilar and basilar opacities.  No pneumothorax seen.                                       X-Ray Hip 2 or 3 views Right (with Pelvis when performed) (Final result)  Result time 04/26/23 09:25:09      Final result by Mellisa Nieto MD (04/26/23 09:25:09)                   Impression:      No acute osseous abnormality.      Electronically signed by: Mellisa Nieto  Date:    04/26/2023  Time:    09:25               Narrative:    EXAMINATION:  XR HIP WITH PELVIS WHEN PERFORMED, 2 OR 3  VIEWS RIGHT    CLINICAL HISTORY:  right hip pain;    TECHNIQUE:  AP view of the pelvis and AP and frog leg lateral view of the right hip were performed.    COMPARISON:  None.    FINDINGS:  BONE: No fracture. No dislocation.    SOFT TISSUES: Unremarkable.                                       Medications - No data to display  Medical Decision Making:   History:   Old Records Summarized: records from clinic visits and records from previous admission(s).  Initial Assessment:   The patient presents with occas nonprod cough, sore throat, nasal congestion, subjective fever, no cp or sob, no known covid-19 exposure.  The onset was 3  days ago.  The course/duration of symptoms is constant.  The degree at present is minimal.  The exacerbating factor is none.  The relieving factor is none.  Risk factors consist of none.  Prior episodes: occasional.  Associated symptoms: decreased appetite, occasional nausea - none at this time, denies abdominal pain, denies dysuria, denies diarrhea, dry cough, nasal congestion, rhinorrhea, sore throat, denies chest pain and denies shortness of breath.  Additional history: none. Also reports right hip pain for 1 week which  is improving. No known injury.    Clinical Tests:   Radiological Study: Ordered and Reviewed                        Clinical Impression:   Final diagnoses:  [R05.9] Cough  [J18.9] Community acquired pneumonia of left lower lobe of lung (Primary)  [M25.551] Right hip pain        ED Disposition Condition    Eloped Stable                LISANDRO Flor  04/26/23 1046

## 2023-09-07 ENCOUNTER — HOSPITAL ENCOUNTER (EMERGENCY)
Facility: HOSPITAL | Age: 28
Discharge: HOME | End: 2023-09-07
Attending: STUDENT IN AN ORGANIZED HEALTH CARE EDUCATION/TRAINING PROGRAM
Payer: MEDICAID

## 2023-09-07 VITALS
OXYGEN SATURATION: 99 % | TEMPERATURE: 98 F | HEART RATE: 98 BPM | WEIGHT: 245.38 LBS | HEIGHT: 66 IN | BODY MASS INDEX: 39.43 KG/M2 | RESPIRATION RATE: 18 BRPM | DIASTOLIC BLOOD PRESSURE: 101 MMHG | SYSTOLIC BLOOD PRESSURE: 141 MMHG

## 2023-09-07 DIAGNOSIS — K64.4 EXTERNAL HEMORRHOID: Primary | ICD-10-CM

## 2023-09-07 PROCEDURE — 99283 EMERGENCY DEPT VISIT LOW MDM: CPT

## 2023-09-07 RX ORDER — LIDOCAINE HYDROCHLORIDE AND HYDROCORTISONE ACETATE 30; 5 MG/G; MG/G
1 CREAM TOPICAL 3 TIMES DAILY PRN
Qty: 28.3 G | Refills: 0 | Status: SHIPPED | OUTPATIENT
Start: 2023-09-07

## 2023-09-07 RX ORDER — DOCUSATE SODIUM 100 MG/1
100 CAPSULE, LIQUID FILLED ORAL DAILY
Qty: 20 CAPSULE | Refills: 0 | Status: SHIPPED | OUTPATIENT
Start: 2023-09-07 | End: 2023-09-27

## 2023-09-07 RX ORDER — POLYETHYLENE GLYCOL 3350 17 G/17G
17 POWDER, FOR SOLUTION ORAL DAILY
Qty: 20 EACH | Refills: 0 | Status: SHIPPED | OUTPATIENT
Start: 2023-09-07 | End: 2023-09-27

## 2023-09-07 NOTE — ED PROVIDER NOTES
Encounter Date: 9/7/2023       History     Chief Complaint   Patient presents with    Hemorrhoids     States was constipated and now has hemorrhoids.  States unable to sit.       28-year-old female presents to ED for 3 days of hemorrhoids.  Has been intermittently constipated recently.  First noticed the hemorrhoid 3 days ago.  States the area is tender to palpation.  No rectal bleeding.  Reports mildly soft stool recently.  No fevers or chills, no trauma, no history of hemorrhoids.  Has not attempted any topical or oral medications at this time. No abdominal pains. No other complaints or concerns at this time.      Review of patient's allergies indicates:  No Known Allergies  History reviewed. No pertinent past medical history.  History reviewed. No pertinent surgical history.  History reviewed. No pertinent family history.  Social History     Tobacco Use    Smoking status: Former     Current packs/day: 0.50     Types: Cigarettes    Smokeless tobacco: Never   Substance Use Topics    Alcohol use: Never    Drug use: Not Currently     Types: Marijuana     Comment: daily     Review of Systems   Constitutional:  Negative for chills, diaphoresis and fever.   HENT:  Negative for congestion, rhinorrhea, sinus pain and sore throat.    Eyes:  Negative for pain, discharge and itching.   Respiratory:  Negative for cough, chest tightness and shortness of breath.    Cardiovascular:  Negative for chest pain and palpitations.   Gastrointestinal:  Positive for constipation. Negative for abdominal pain, nausea and vomiting.        Hemorrhoids   Genitourinary:  Negative for dysuria, flank pain and hematuria.   Musculoskeletal:  Negative for back pain and myalgias.   Skin:  Negative for color change and rash.   Neurological:  Negative for dizziness, weakness and headaches.   Psychiatric/Behavioral:  Negative for confusion. The patient is not hyperactive.        Physical Exam     Initial Vitals [09/07/23 0640]   BP Pulse Resp Temp SpO2    (!) 141/101 98 18 98.4 °F (36.9 °C) 99 %      MAP       --         Physical Exam    Vitals reviewed.  Constitutional: She appears well-developed and well-nourished. She is not diaphoretic. No distress.   HENT:   Head: Normocephalic and atraumatic.   Eyes: Conjunctivae and EOM are normal. Pupils are equal, round, and reactive to light.   Neck: Neck supple. No tracheal deviation present.   Normal range of motion.  Cardiovascular:  Normal rate, regular rhythm, normal heart sounds and intact distal pulses.           Pulmonary/Chest: Breath sounds normal. No respiratory distress.   Abdominal: Abdomen is soft. There is no abdominal tenderness. There is no rebound and no guarding.   Genitourinary:    Genitourinary Comments: Chaperone present during external anal exam.         Musculoskeletal:         General: Normal range of motion.      Cervical back: Normal range of motion and neck supple.     Neurological: She is alert and oriented to person, place, and time. She has normal strength. GCS score is 15. GCS eye subscore is 4. GCS verbal subscore is 5. GCS motor subscore is 6.   Skin: Skin is warm and dry. Capillary refill takes less than 2 seconds. No rash noted.   Psychiatric: She has a normal mood and affect. Her behavior is normal. Judgment and thought content normal.         ED Course   Procedures  Labs Reviewed - No data to display       Imaging Results    None          Medications - No data to display  Medical Decision Making  28-year-old female presents to ED for hemorrhoids    Differential.  External versus internal hemorrhoids, constipation, perianal versus perirectal abscess    Patient resting comfortably on her stomach upon entry.  Rectal region examined under female chaperone presence.  Two very small hemorrhoids externally present nontender to palpation, no erythema or evidence of infection.  Has no fluctuance or other external rectal findings.  Vitals stable.  Will prescribe medication topically for  hemorrhoids, stool softener and p.r.n. laxative.  Will follow up in outpatient setting and provided strict return precautions.  Discharged stable. ([place])     Risk  OTC drugs.  Prescription drug management.                               Clinical Impression:   Final diagnoses:  [K64.4] External hemorrhoid (Primary)        ED Disposition Condition    Discharge Stable          ED Prescriptions       Medication Sig Dispense Start Date End Date Auth. Provider    docusate sodium (COLACE) 100 MG capsule Take 1 capsule (100 mg total) by mouth once daily. for 20 days 20 capsule 9/7/2023 9/27/2023 Jeramie Norton MD    polyethylene glycol (GLYCOLAX) 17 gram PwPk Take 17 g by mouth once daily. for 20 days 20 each 9/7/2023 9/27/2023 Jeramie Norton MD    LIDOcaine HCl-hydrocortison ac (LIDAMANTLE-HC) 3-0.5 % topical cream Apply 1 mL topically 3 (three) times daily as needed (Hemorrhoid). 28.3 g 9/7/2023 -- Jeramie Norton MD          Follow-up Information       Follow up With Specialties Details Why Contact Info    [facility] Emergency Medicine  As needed, If symptoms worsen [address] [facility] [place] [address] [phone]    Primary  Schedule an appointment as soon as possible for a visit                Jeramie Norton MD  09/08/23 0110

## 2023-11-08 ENCOUNTER — HOSPITAL ENCOUNTER (EMERGENCY)
Facility: HOSPITAL | Age: 28
Discharge: PSYCHIATRIC OR SUBSTANCE USE FACILITY | End: 2023-11-08
Attending: STUDENT IN AN ORGANIZED HEALTH CARE EDUCATION/TRAINING PROGRAM
Payer: MEDICAID

## 2023-11-08 VITALS
HEART RATE: 80 BPM | RESPIRATION RATE: 20 BRPM | TEMPERATURE: 98 F | BODY MASS INDEX: 33.75 KG/M2 | OXYGEN SATURATION: 100 % | HEIGHT: 66 IN | SYSTOLIC BLOOD PRESSURE: 145 MMHG | DIASTOLIC BLOOD PRESSURE: 62 MMHG | WEIGHT: 210 LBS

## 2023-11-08 DIAGNOSIS — R45.851 SUICIDAL IDEATIONS: ICD-10-CM

## 2023-11-08 DIAGNOSIS — Z00.8 MEDICAL CLEARANCE FOR PSYCHIATRIC ADMISSION: Primary | ICD-10-CM

## 2023-11-08 LAB
ALBUMIN SERPL-MCNC: 3.6 G/DL (ref 3.5–5)
ALBUMIN/GLOB SERPL: 1.1 RATIO (ref 1.1–2)
ALP SERPL-CCNC: 102 UNIT/L (ref 40–150)
ALT SERPL-CCNC: 11 UNIT/L (ref 0–55)
AMPHET UR QL SCN: POSITIVE
APAP SERPL-MCNC: <17.4 UG/ML (ref 17.4–30)
AST SERPL-CCNC: 15 UNIT/L (ref 5–34)
B-HCG UR QL: NEGATIVE
BACTERIA #/AREA URNS AUTO: ABNORMAL /HPF
BARBITURATE SCN PRESENT UR: NEGATIVE
BASOPHILS # BLD AUTO: 0.03 X10(3)/MCL
BASOPHILS NFR BLD AUTO: 0.4 %
BENZODIAZ UR QL SCN: NEGATIVE
BILIRUB SERPL-MCNC: 0.3 MG/DL
BILIRUB UR QL STRIP.AUTO: NEGATIVE
BUN SERPL-MCNC: 9.4 MG/DL (ref 7–18.7)
CALCIUM SERPL-MCNC: 9.8 MG/DL (ref 8.4–10.2)
CANNABINOIDS UR QL SCN: POSITIVE
CHLORIDE SERPL-SCNC: 108 MMOL/L (ref 98–107)
CO2 SERPL-SCNC: 21 MMOL/L (ref 22–29)
COCAINE UR QL SCN: POSITIVE
COLOR UR AUTO: YELLOW
CREAT SERPL-MCNC: 1.07 MG/DL (ref 0.55–1.02)
CTP QC/QA: YES
EOSINOPHIL # BLD AUTO: 0.03 X10(3)/MCL (ref 0–0.9)
EOSINOPHIL NFR BLD AUTO: 0.4 %
ERYTHROCYTE [DISTWIDTH] IN BLOOD BY AUTOMATED COUNT: 12.3 % (ref 11.5–17)
ETHANOL SERPL-MCNC: <10 MG/DL
FENTANYL UR QL SCN: NEGATIVE
GFR SERPLBLD CREATININE-BSD FMLA CKD-EPI: >60 MLS/MIN/1.73/M2
GLOBULIN SER-MCNC: 3.4 GM/DL (ref 2.4–3.5)
GLUCOSE SERPL-MCNC: 95 MG/DL (ref 74–100)
GLUCOSE UR QL STRIP.AUTO: NORMAL
HCT VFR BLD AUTO: 38.3 % (ref 37–47)
HGB BLD-MCNC: 12.8 G/DL (ref 12–16)
HOLD SPECIMEN: NORMAL
HOLD SPECIMEN: NORMAL
HYALINE CASTS #/AREA URNS LPF: ABNORMAL /LPF
IMM GRANULOCYTES # BLD AUTO: 0.02 X10(3)/MCL (ref 0–0.04)
IMM GRANULOCYTES NFR BLD AUTO: 0.3 %
KETONES UR QL STRIP.AUTO: ABNORMAL
LEUKOCYTE ESTERASE UR QL STRIP.AUTO: 25
LYMPHOCYTES # BLD AUTO: 2.89 X10(3)/MCL (ref 0.6–4.6)
LYMPHOCYTES NFR BLD AUTO: 38.4 %
MCH RBC QN AUTO: 29.7 PG (ref 27–31)
MCHC RBC AUTO-ENTMCNC: 33.4 G/DL (ref 33–36)
MCV RBC AUTO: 88.9 FL (ref 80–94)
MDMA UR QL SCN: NEGATIVE
MONOCYTES # BLD AUTO: 0.4 X10(3)/MCL (ref 0.1–1.3)
MONOCYTES NFR BLD AUTO: 5.3 %
MUCOUS THREADS URNS QL MICRO: ABNORMAL /LPF
NEUTROPHILS # BLD AUTO: 4.15 X10(3)/MCL (ref 2.1–9.2)
NEUTROPHILS NFR BLD AUTO: 55.2 %
NITRITE UR QL STRIP.AUTO: ABNORMAL
NRBC BLD AUTO-RTO: 0 %
OPIATES UR QL SCN: NEGATIVE
PCP UR QL: NEGATIVE
PH UR STRIP.AUTO: 6 [PH]
PH UR: 6 [PH] (ref 3–11)
PLATELET # BLD AUTO: 273 X10(3)/MCL (ref 130–400)
PMV BLD AUTO: 9.9 FL (ref 7.4–10.4)
POTASSIUM SERPL-SCNC: 3 MMOL/L (ref 3.5–5.1)
PROT SERPL-MCNC: 7 GM/DL (ref 6.4–8.3)
PROT UR QL STRIP.AUTO: ABNORMAL
RBC # BLD AUTO: 4.31 X10(6)/MCL (ref 4.2–5.4)
RBC #/AREA URNS AUTO: ABNORMAL /HPF
RBC UR QL AUTO: ABNORMAL
SARS-COV-2 RDRP RESP QL NAA+PROBE: NEGATIVE
SODIUM SERPL-SCNC: 139 MMOL/L (ref 136–145)
SP GR UR STRIP.AUTO: 1.04 (ref 1–1.03)
SPECIFIC GRAVITY, URINE AUTO (.000) (OHS): 1.04 (ref 1–1.03)
SQUAMOUS #/AREA URNS LPF: ABNORMAL /HPF
TSH SERPL-ACNC: 0.48 UIU/ML (ref 0.35–4.94)
UROBILINOGEN UR STRIP-ACNC: ABNORMAL
WBC # SPEC AUTO: 7.52 X10(3)/MCL (ref 4.5–11.5)
WBC #/AREA URNS AUTO: ABNORMAL /HPF

## 2023-11-08 PROCEDURE — 99285 EMERGENCY DEPT VISIT HI MDM: CPT

## 2023-11-08 PROCEDURE — 81001 URINALYSIS AUTO W/SCOPE: CPT | Mod: XB | Performed by: STUDENT IN AN ORGANIZED HEALTH CARE EDUCATION/TRAINING PROGRAM

## 2023-11-08 PROCEDURE — 81025 URINE PREGNANCY TEST: CPT | Performed by: STUDENT IN AN ORGANIZED HEALTH CARE EDUCATION/TRAINING PROGRAM

## 2023-11-08 PROCEDURE — 87077 CULTURE AEROBIC IDENTIFY: CPT | Performed by: STUDENT IN AN ORGANIZED HEALTH CARE EDUCATION/TRAINING PROGRAM

## 2023-11-08 PROCEDURE — 82077 ASSAY SPEC XCP UR&BREATH IA: CPT | Performed by: STUDENT IN AN ORGANIZED HEALTH CARE EDUCATION/TRAINING PROGRAM

## 2023-11-08 PROCEDURE — 84443 ASSAY THYROID STIM HORMONE: CPT | Performed by: STUDENT IN AN ORGANIZED HEALTH CARE EDUCATION/TRAINING PROGRAM

## 2023-11-08 PROCEDURE — 87086 URINE CULTURE/COLONY COUNT: CPT | Performed by: STUDENT IN AN ORGANIZED HEALTH CARE EDUCATION/TRAINING PROGRAM

## 2023-11-08 PROCEDURE — 25000003 PHARM REV CODE 250: Performed by: STUDENT IN AN ORGANIZED HEALTH CARE EDUCATION/TRAINING PROGRAM

## 2023-11-08 PROCEDURE — 80053 COMPREHEN METABOLIC PANEL: CPT | Performed by: STUDENT IN AN ORGANIZED HEALTH CARE EDUCATION/TRAINING PROGRAM

## 2023-11-08 PROCEDURE — 80143 DRUG ASSAY ACETAMINOPHEN: CPT | Performed by: STUDENT IN AN ORGANIZED HEALTH CARE EDUCATION/TRAINING PROGRAM

## 2023-11-08 PROCEDURE — 87635 SARS-COV-2 COVID-19 AMP PRB: CPT | Performed by: STUDENT IN AN ORGANIZED HEALTH CARE EDUCATION/TRAINING PROGRAM

## 2023-11-08 PROCEDURE — 85025 COMPLETE CBC W/AUTO DIFF WBC: CPT | Performed by: STUDENT IN AN ORGANIZED HEALTH CARE EDUCATION/TRAINING PROGRAM

## 2023-11-08 PROCEDURE — 80307 DRUG TEST PRSMV CHEM ANLYZR: CPT | Performed by: STUDENT IN AN ORGANIZED HEALTH CARE EDUCATION/TRAINING PROGRAM

## 2023-11-08 RX ORDER — LORAZEPAM 1 MG/1
2 TABLET ORAL
Status: DISCONTINUED | OUTPATIENT
Start: 2023-11-08 | End: 2023-11-09 | Stop reason: HOSPADM

## 2023-11-08 RX ORDER — NITROFURANTOIN 25; 75 MG/1; MG/1
100 CAPSULE ORAL 2 TIMES DAILY
Qty: 14 CAPSULE | Refills: 0 | Status: SHIPPED | OUTPATIENT
Start: 2023-11-08 | End: 2023-11-15

## 2023-11-08 RX ORDER — POTASSIUM CHLORIDE 20 MEQ/1
40 TABLET, EXTENDED RELEASE ORAL ONCE
Status: COMPLETED | OUTPATIENT
Start: 2023-11-08 | End: 2023-11-08

## 2023-11-08 RX ADMIN — POTASSIUM CHLORIDE 40 MEQ: 1500 TABLET, EXTENDED RELEASE ORAL at 08:11

## 2023-11-09 NOTE — ED PROVIDER NOTES
Encounter Date: 11/8/2023       History     Chief Complaint   Patient presents with    Psychiatric Evaluation     Patient states SI; superficial laceration to left wrist, reportedly cut yesterday as part of stated suicide attempt. Hx of bipolar and schizophrenia, non compliant with meds x 1 month      Patient presents to the emergency department due to suicidal ideations.  Has a history of depression and previous suicide attempt.  She states she was having bad thoughts and wants to kill herself.  She was self inflicted a superficial laceration to the left anterior wrist yesterday.  Unknown last tetanus.  She states she is been abusing cocaine denies any alcohol use.  No homicidal ideations or hallucinations.    The history is provided by the patient.     Review of patient's allergies indicates:  No Known Allergies  No past medical history on file.  No past surgical history on file.  No family history on file.  Social History     Tobacco Use    Smoking status: Former     Current packs/day: 0.50     Types: Cigarettes    Smokeless tobacco: Never   Substance Use Topics    Alcohol use: Never    Drug use: Not Currently     Types: Marijuana     Comment: daily     Review of Systems   Constitutional:  Negative for chills and fever.   HENT:  Negative for congestion and sore throat.    Respiratory:  Negative for cough and shortness of breath.    Cardiovascular:  Negative for chest pain and palpitations.   Gastrointestinal:  Negative for abdominal pain and nausea.   Genitourinary:  Negative for dysuria and hematuria.   Musculoskeletal:  Negative for arthralgias and myalgias.   Neurological:  Negative for dizziness and weakness.   Psychiatric/Behavioral:  Positive for dysphoric mood, self-injury and suicidal ideas.        Physical Exam     Initial Vitals [11/08/23 1712]   BP Pulse Resp Temp SpO2   (!) 150/87 87 18 96.8 °F (36 °C) 100 %      MAP       --         Physical Exam    Nursing note and vitals reviewed.  Constitutional:  She appears well-developed and well-nourished.   HENT:   Head: Normocephalic and atraumatic.   Eyes: EOM are normal. Pupils are equal, round, and reactive to light.   Neck: Neck supple.   Normal range of motion.  Cardiovascular:  Normal rate and regular rhythm.           Pulmonary/Chest: Breath sounds normal. No respiratory distress.   Abdominal: Abdomen is soft. There is no abdominal tenderness.   Musculoskeletal:         General: No edema. Normal range of motion.      Cervical back: Normal range of motion and neck supple.     Neurological: She is alert and oriented to person, place, and time.   Skin: Skin is warm and dry.   Very superficial laceration to the anterior left wrist, scab intact, no signs of infection         ED Course   Procedures  Labs Reviewed   COMPREHENSIVE METABOLIC PANEL - Abnormal; Notable for the following components:       Result Value    Potassium Level 3.0 (*)     Chloride 108 (*)     Carbon Dioxide 21 (*)     Creatinine 1.07 (*)     All other components within normal limits   URINALYSIS, REFLEX TO URINE CULTURE - Abnormal; Notable for the following components:    Specific Gravity, UA 1.037 (*)     Protein, UA 2+ (*)     Ketones, UA Trace (*)     Blood, UA Trace (*)     Urobilinogen, UA 2+ (*)     Nitrites, UA 2+ (*)     Leukocyte Esterase, UA 25 (*)     WBC, UA 11-20 (*)     Bacteria, UA Many (*)     Squamous Epithelial Cells, UA Many (*)     Mucous, UA Many (*)     Hyaline Casts, UA 3-5 (*)     All other components within normal limits   DRUG SCREEN, URINE (BEAKER) - Abnormal; Notable for the following components:    Amphetamines, Urine Positive (*)     Cannabinoids, Urine Positive (*)     Cocaine, Urine Positive (*)     Specific Gravity, Urine Auto 1.037 (*)     All other components within normal limits    Narrative:     Cut off concentrations:    Amphetamines - 1000 ng/ml  Barbiturates - 200 ng/ml  Benzodiazepine - 200 ng/ml  Cannabinoids (THC) - 50 ng/ml  Cocaine - 300 ng/ml  Fentanyl  - 1.0 ng/ml  MDMA - 500 ng/ml  Opiates - 300 ng/ml   Phencyclidine (PCP) - 25 ng/ml    Specimen submitted for drug analysis and tested for pH and specific gravity in order to evaluate sample integrity. Suspect tampering if specific gravity is <1.003 and/or pH is not within the range of 4.5 - 8.0  False negatives may result form substances such as bleach added to urine.  False positives may result for the presence of a substance with similar chemical structure to the drug or its metabolite.    This test provides only a PRELIMINARY analytical test result. A more specific alternate chemical method must be used in order to obtain a confirmed analytical result. Gas chromatography/mass spectrometry (GC/MS) is the preferred confirmatory method. Other chemical confirmation methods are available. Clinical consideration and professional judgement should be applied to any drug of abuse test result, particularly when preliminary positive results are used.    Positive results will be confirmed only at the physicians request. Unconfirmed screening results are to be used only for medical purposes (treatment).        ACETAMINOPHEN LEVEL - Abnormal; Notable for the following components:    Acetaminophen Level <17.4 (*)     All other components within normal limits   TSH - Normal   ALCOHOL,MEDICAL (ETHANOL) - Normal   SARS-COV-2 RNA AMPLIFICATION, QUAL - Normal    Narrative:     The IDNOW COVID-19 assay is a rapid molecular in vitro diagnostic test utilizing an isothermal nucleic acid amplification technology intended for the qualitative detection of nucleic acid from the SARS-CoV-2 viral RNA in direct nasal, nasopharyngeal or throat swabs from individuals who are suspected of COVID-19 by their healthcare provider.   CULTURE, URINE   CBC W/ AUTO DIFFERENTIAL    Narrative:     The following orders were created for panel order CBC auto differential.  Procedure                               Abnormality         Status                      ---------                               -----------         ------                     CBC with Differential[[id number]]                           Final result                 Please view results for these tests on the individual orders.   CBC WITH DIFFERENTIAL   EXTRA TUBES    Narrative:     The following orders were created for panel order EXTRA TUBES.  Procedure                               Abnormality         Status                     ---------                               -----------         ------                     Light Blue Top Hold[[id number]]                             In process                 Gold Top Hold[[id number]]                                   In process                   Please view results for these tests on the individual orders.   LIGHT BLUE TOP HOLD   GOLD TOP HOLD   POCT URINE PREGNANCY          Imaging Results    None          Medications   LORazepam tablet 2 mg (has no administration in time range)   Tdap (BOOSTRIX) vaccine injection 0.5 mL (has no administration in time range)     Medical Decision Making  PEC placed.  Tetanus was updated.  CBC, CMP shows mild hypokalemia but otherwise unremarkable, blood tox panels were negative.  Urine is suggestive of UTI, will provide a prescription for Macrobid.  Patient was medically stable for psychiatric admission.    Amount and/or Complexity of Data Reviewed  Labs: ordered. Decision-making details documented in ED Course.    Risk  Prescription drug management.                               Clinical Impression:   Final diagnoses:  [Z00.8] Medical clearance for psychiatric admission (Primary)  [R45.757] Suicidal ideations        ED Disposition Condition    Transfer to Psych Facility Stable          ED Prescriptions       Medication Sig Dispense Start Date End Date Auth. Provider    nitrofurantoin, macrocrystal-monohydrate, (MACROBID) 100 MG capsule Take 1 capsule (100 mg total) by mouth 2 (two) times daily. for 7 days 14 capsule 11/8/2023  11/15/2023 Karl Clements MD          Follow-up Information    None          Kalr Clements MD  11/08/23 1911

## 2023-11-13 LAB — BACTERIA UR CULT: ABNORMAL

## 2025-05-21 ENCOUNTER — HOSPITAL ENCOUNTER (EMERGENCY)
Facility: HOSPITAL | Age: 30
Discharge: HOME | End: 2025-05-21
Attending: EMERGENCY MEDICINE
Payer: MEDICAID

## 2025-05-21 VITALS
DIASTOLIC BLOOD PRESSURE: 76 MMHG | RESPIRATION RATE: 17 BRPM | BODY MASS INDEX: 32.11 KG/M2 | WEIGHT: 199.81 LBS | HEIGHT: 66 IN | SYSTOLIC BLOOD PRESSURE: 112 MMHG | OXYGEN SATURATION: 99 % | HEART RATE: 71 BPM | TEMPERATURE: 98 F

## 2025-05-21 DIAGNOSIS — N93.9 VAGINAL BLEEDING: Primary | ICD-10-CM

## 2025-05-21 DIAGNOSIS — L03.114 CELLULITIS OF LEFT FOREARM: ICD-10-CM

## 2025-05-21 LAB
ALBUMIN SERPL-MCNC: 2.8 G/DL (ref 3.5–5)
ALBUMIN/GLOB SERPL: 0.8 RATIO (ref 1.1–2)
ALP SERPL-CCNC: 87 UNIT/L (ref 40–150)
ALT SERPL-CCNC: 7 UNIT/L (ref 0–55)
ANION GAP SERPL CALC-SCNC: 7 MEQ/L
AST SERPL-CCNC: 13 UNIT/L (ref 11–45)
B-HCG UR QL: NEGATIVE
BASOPHILS # BLD AUTO: 0.04 X10(3)/MCL
BASOPHILS NFR BLD AUTO: 0.5 %
BILIRUB SERPL-MCNC: 0.1 MG/DL
BUN SERPL-MCNC: 13.5 MG/DL (ref 7–18.7)
CALCIUM SERPL-MCNC: 8.6 MG/DL (ref 8.4–10.2)
CHLORIDE SERPL-SCNC: 107 MMOL/L (ref 98–107)
CO2 SERPL-SCNC: 24 MMOL/L (ref 22–29)
CREAT SERPL-MCNC: 0.75 MG/DL (ref 0.55–1.02)
CREAT/UREA NIT SERPL: 18
CTP QC/QA: YES
EOSINOPHIL # BLD AUTO: 0.04 X10(3)/MCL (ref 0–0.9)
EOSINOPHIL NFR BLD AUTO: 0.5 %
ERYTHROCYTE [DISTWIDTH] IN BLOOD BY AUTOMATED COUNT: 12 % (ref 11.5–17)
GFR SERPLBLD CREATININE-BSD FMLA CKD-EPI: >60 ML/MIN/1.73/M2
GLOBULIN SER-MCNC: 3.3 GM/DL (ref 2.4–3.5)
GLUCOSE SERPL-MCNC: 85 MG/DL (ref 74–100)
HCT VFR BLD AUTO: 36.8 % (ref 37–47)
HGB BLD-MCNC: 11.8 G/DL (ref 12–16)
IMM GRANULOCYTES # BLD AUTO: 0.05 X10(3)/MCL (ref 0–0.04)
IMM GRANULOCYTES NFR BLD AUTO: 0.7 %
LYMPHOCYTES # BLD AUTO: 2.92 X10(3)/MCL (ref 0.6–4.6)
LYMPHOCYTES NFR BLD AUTO: 39.5 %
MCH RBC QN AUTO: 30.3 PG (ref 27–31)
MCHC RBC AUTO-ENTMCNC: 32.1 G/DL (ref 33–36)
MCV RBC AUTO: 94.6 FL (ref 80–94)
MONOCYTES # BLD AUTO: 0.45 X10(3)/MCL (ref 0.1–1.3)
MONOCYTES NFR BLD AUTO: 6.1 %
NEUTROPHILS # BLD AUTO: 3.9 X10(3)/MCL (ref 2.1–9.2)
NEUTROPHILS NFR BLD AUTO: 52.7 %
NRBC BLD AUTO-RTO: 0 %
PLATELET # BLD AUTO: 231 X10(3)/MCL (ref 130–400)
PMV BLD AUTO: 10.1 FL (ref 7.4–10.4)
POTASSIUM SERPL-SCNC: 4.5 MMOL/L (ref 3.5–5.1)
PROT SERPL-MCNC: 6.1 GM/DL (ref 6.4–8.3)
RBC # BLD AUTO: 3.89 X10(6)/MCL (ref 4.2–5.4)
SODIUM SERPL-SCNC: 138 MMOL/L (ref 136–145)
WBC # BLD AUTO: 7.4 X10(3)/MCL (ref 4.5–11.5)

## 2025-05-21 PROCEDURE — 25000003 PHARM REV CODE 250: Performed by: PHYSICIAN ASSISTANT

## 2025-05-21 PROCEDURE — 85025 COMPLETE CBC W/AUTO DIFF WBC: CPT | Performed by: PHYSICIAN ASSISTANT

## 2025-05-21 PROCEDURE — 80053 COMPREHEN METABOLIC PANEL: CPT | Performed by: PHYSICIAN ASSISTANT

## 2025-05-21 PROCEDURE — 99283 EMERGENCY DEPT VISIT LOW MDM: CPT

## 2025-05-21 PROCEDURE — 81025 URINE PREGNANCY TEST: CPT | Performed by: PHYSICIAN ASSISTANT

## 2025-05-21 RX ORDER — SULFAMETHOXAZOLE AND TRIMETHOPRIM 800; 160 MG/1; MG/1
2 TABLET ORAL 2 TIMES DAILY
Qty: 28 TABLET | Refills: 0 | Status: SHIPPED | OUTPATIENT
Start: 2025-05-21 | End: 2025-05-28

## 2025-05-21 RX ORDER — SULFAMETHOXAZOLE AND TRIMETHOPRIM 800; 160 MG/1; MG/1
2 TABLET ORAL
Status: COMPLETED | OUTPATIENT
Start: 2025-05-21 | End: 2025-05-21

## 2025-05-21 RX ORDER — BACITRACIN ZINC 500 UNIT/G
OINTMENT (GRAM) TOPICAL 2 TIMES DAILY
Qty: 30 G | Refills: 0 | Status: SHIPPED | OUTPATIENT
Start: 2025-05-21 | End: 2025-05-26

## 2025-05-21 RX ADMIN — SULFAMETHOXAZOLE AND TRIMETHOPRIM 2 TABLET: 800; 160 TABLET ORAL at 07:05

## 2025-05-21 NOTE — DISCHARGE INSTRUCTIONS
Take medications as prescribed with food and water until complete.  Drink lots of water with this medication.  Return in 2 days for recheck.  Return immediately if symptoms worsen.  Referral sent to [facility], call to schedule an appointment.

## 2025-05-21 NOTE — ED PROVIDER NOTES
Encounter Date: 5/21/2025       History     Chief Complaint   Patient presents with    Vaginal Bleeding     C/o vaginal bleeding x2 weeks reports period x1 month ago denies this is a period. Reports left arm swelling reports bug sting.     29-year-old female presents to the emergency department with complaints of vaginal bleeding x 2 weeks.  She states she goes through about 8 tampons a day.  Patient states this is not her menstrual cycle.  She denies dizziness, weakness, headache, shortness of breath.  She also reports that a bug bit her left arm a couple of days ago and now she has skin redness, pain and mild drainage.    The history is provided by the patient. No  was used.     Review of patient's allergies indicates:  No Known Allergies  History reviewed. No pertinent past medical history.  History reviewed. No pertinent surgical history.  No family history on file.  Social History[1]  Review of Systems   Genitourinary:  Positive for vaginal bleeding.   Skin:         Bug bite to left forearm        Physical Exam     Initial Vitals [05/21/25 1831]   BP Pulse Resp Temp SpO2   118/78 75 18 98.2 °F (36.8 °C) 99 %      MAP       --         Physical Exam    Nursing note and vitals reviewed.  Constitutional: She appears well-developed and well-nourished.   Cardiovascular:  Normal rate.           Pulmonary/Chest: Breath sounds normal.   Abdominal: Abdomen is soft. Bowel sounds are normal.   Musculoskeletal:         General: Normal range of motion.      Comments: Left distal forearm with erythema (6 cm x 4 cm), induration, no fluctuance.  No active drainage.     Neurological: She is alert.   Skin: Skin is warm. Capillary refill takes less than 2 seconds.         ED Course   Procedures  Labs Reviewed   COMPREHENSIVE METABOLIC PANEL - Abnormal       Result Value    Sodium 138      Potassium 4.5      Chloride 107      CO2 24      Glucose 85      Blood Urea Nitrogen 13.5      Creatinine 0.75      Calcium  8.6      Protein Total 6.1 (*)     Albumin 2.8 (*)     Globulin 3.3      Albumin/Globulin Ratio 0.8 (*)     Bilirubin Total 0.1      ALP 87      ALT 7      AST 13      eGFR >60      Anion Gap 7.0      BUN/Creatinine Ratio 18     CBC WITH DIFFERENTIAL - Abnormal    WBC 7.40      RBC 3.89 (*)     Hgb 11.8 (*)     Hct 36.8 (*)     MCV 94.6 (*)     MCH 30.3      MCHC 32.1 (*)     RDW 12.0      Platelet 231      MPV 10.1      Neut % 52.7      Lymph % 39.5      Mono % 6.1      Eos % 0.5      Basophil % 0.5      Imm Grans % 0.7      Neut # 3.90      Lymph # 2.92      Mono # 0.45      Eos # 0.04      Baso # 0.04      Imm Gran # 0.05 (*)     NRBC% 0.0     CBC W/ AUTO DIFFERENTIAL    Narrative:     The following orders were created for panel order CBC Auto Differential.  Procedure                               Abnormality         Status                     ---------                               -----------         ------                     CBC with Differential[[id number]]       Abnormal            Final result                 Please view results for these tests on the individual orders.   POCT URINE PREGNANCY    POC Preg Test, Ur Negative       Acceptable Yes            Imaging Results    None          Medications   sulfamethoxazole-trimethoprim 800-160mg per tablet 2 tablet (2 tablets Oral Given 5/21/25 1945)     Medical Decision Making  29-year-old female presents to the emergency department with complaints of vaginal bleeding x 2 weeks.  She states she goes through about 8 tampons a day.  Patient states this is not her menstrual cycle.  She denies dizziness, weakness, headache, shortness of breath.  She also reports that a bug bit her left arm a couple of days ago and now she has skin redness, pain and mild drainage.    DDx:  Cellulitis, skin abscess, fibroids, menstrual cycle, hormone imbalance    Left arm 6 cm x 4 cm cellulitis, no abscess.  Bactrim given in the ED.   Bactrim and Bactroban prescribed.   Stable H&H.  Referral sent to gyn Clinic.            Amount and/or Complexity of Data Reviewed  Labs: ordered. Decision-making details documented in ED Course.    Risk  OTC drugs.  Prescription drug management.               ED Course as of 05/21/25 2017   Wed May 21, 2025   1907 The patient is resting comfortably and in no acute distress.   I personally discussed her  treatment plan.  Gave strict ED precautions, discussed specific conditions for return to the emergency department and importance of follow up with her primary care provider.  Patient voices understanding and agrees to the plan discussed. All patients' questions have been answered at this time.   She has remained hemodynamically stable throughout entire stay in ED and is stable for discharge home.  [ER]   1940 Hemoglobin(!): 11.8 [ER]   1940 Hematocrit(!): 36.8 [ER]      ED Course User Index  [ER] Galilea Park PA                           Clinical Impression:  Final diagnoses:  [N93.9] Vaginal bleeding (Primary)  [L03.114] Cellulitis of left forearm          ED Disposition Condition    Discharge Stable          ED Prescriptions       Medication Sig Dispense Start Date End Date Auth. Provider    bacitracin 500 unit/gram Oint Apply topically 2 (two) times daily. for 5 days 30 g 5/21/2025 5/26/2025 Galilea Park PA    sulfamethoxazole-trimethoprim 800-160mg (BACTRIM DS) 800-160 mg Tab Take 2 tablets by mouth 2 (two) times daily. for 7 days 28 tablet 5/21/2025 5/28/2025 Galilea Park PA          Follow-up Information       Follow up With Specialties Details Why Contact Info    [facility] - Emergency Dept Emergency Medicine  As needed, If symptoms worsen [address] [facility] [address] [phone]                   [1]   Social History  Tobacco Use    Smoking status: Former     Current packs/day: 0.50     Types: Cigarettes    Smokeless tobacco: Never   Substance Use Topics    Alcohol use: Never    Drug use: Not Currently      Types: Marijuana     Comment: daily        Galilea Park PA  05/21/25 2017       Galilea Park PA  05/21/25 2018

## 2025-07-08 ENCOUNTER — HOSPITAL ENCOUNTER (EMERGENCY)
Facility: HOSPITAL | Age: 30
Discharge: PSYCHIATRIC OR SUBSTANCE USE FACILITY | End: 2025-07-09
Attending: EMERGENCY MEDICINE
Payer: MEDICAID

## 2025-07-08 DIAGNOSIS — F19.90 ILLICIT DRUG USE: ICD-10-CM

## 2025-07-08 DIAGNOSIS — Z86.59 HISTORY OF SCHIZOPHRENIA: ICD-10-CM

## 2025-07-08 DIAGNOSIS — R46.89 AGGRESSIVE BEHAVIOR: Primary | ICD-10-CM

## 2025-07-08 LAB
ACCEPTIBLE SP GR UR QL: 1.04 (ref 1–1.03)
ALBUMIN SERPL-MCNC: 3.7 G/DL (ref 3.5–5)
ALBUMIN/GLOB SERPL: 1 RATIO (ref 1.1–2)
ALP SERPL-CCNC: 96 UNIT/L (ref 40–150)
ALT SERPL-CCNC: 11 UNIT/L (ref 0–55)
AMPHET UR QL SCN: POSITIVE
ANION GAP SERPL CALC-SCNC: 10 MEQ/L
APAP SERPL-MCNC: <3 UG/ML (ref 10–30)
AST SERPL-CCNC: 26 UNIT/L (ref 11–45)
B-HCG SERPL QL: NEGATIVE
BACTERIA #/AREA URNS AUTO: ABNORMAL /HPF
BARBITURATE SCN PRESENT UR: NEGATIVE
BASOPHILS # BLD AUTO: 0.04 X10(3)/MCL
BASOPHILS NFR BLD AUTO: 0.6 %
BENZODIAZ UR QL SCN: NEGATIVE
BILIRUB SERPL-MCNC: 0.6 MG/DL
BILIRUB UR QL STRIP.AUTO: NEGATIVE
BUN SERPL-MCNC: 18.8 MG/DL (ref 7–18.7)
CALCIUM SERPL-MCNC: 9.2 MG/DL (ref 8.4–10.2)
CANNABINOIDS UR QL SCN: POSITIVE
CHLORIDE SERPL-SCNC: 113 MMOL/L (ref 98–107)
CLARITY UR: CLEAR
CO2 SERPL-SCNC: 16 MMOL/L (ref 22–29)
COCAINE UR QL SCN: POSITIVE
COLOR UR AUTO: YELLOW
CREAT SERPL-MCNC: 1.06 MG/DL (ref 0.55–1.02)
CREAT/UREA NIT SERPL: 18
EOSINOPHIL # BLD AUTO: 0.06 X10(3)/MCL (ref 0–0.9)
EOSINOPHIL NFR BLD AUTO: 0.8 %
ERYTHROCYTE [DISTWIDTH] IN BLOOD BY AUTOMATED COUNT: 11.9 % (ref 11.5–17)
ETHANOL SERPL-MCNC: <10 MG/DL
FENTANYL UR QL SCN: POSITIVE
GFR SERPLBLD CREATININE-BSD FMLA CKD-EPI: >60 ML/MIN/1.73/M2
GLOBULIN SER-MCNC: 3.7 GM/DL (ref 2.4–3.5)
GLUCOSE SERPL-MCNC: 70 MG/DL (ref 74–100)
GLUCOSE UR QL STRIP: NORMAL
HCT VFR BLD AUTO: 38.8 % (ref 37–47)
HGB BLD-MCNC: 12.9 G/DL (ref 12–16)
HGB UR QL STRIP: NEGATIVE
HYALINE CASTS #/AREA URNS LPF: ABNORMAL /LPF
IMM GRANULOCYTES # BLD AUTO: 0.04 X10(3)/MCL (ref 0–0.04)
IMM GRANULOCYTES NFR BLD AUTO: 0.6 %
KETONES UR QL STRIP: ABNORMAL
LEUKOCYTE ESTERASE UR QL STRIP: NEGATIVE
LYMPHOCYTES # BLD AUTO: 2.53 X10(3)/MCL (ref 0.6–4.6)
LYMPHOCYTES NFR BLD AUTO: 34.8 %
MCH RBC QN AUTO: 30.7 PG (ref 27–31)
MCHC RBC AUTO-ENTMCNC: 33.2 G/DL (ref 33–36)
MCV RBC AUTO: 92.4 FL (ref 80–94)
MDMA UR QL SCN: NEGATIVE
MONOCYTES # BLD AUTO: 0.7 X10(3)/MCL (ref 0.1–1.3)
MONOCYTES NFR BLD AUTO: 9.6 %
MUCOUS THREADS URNS QL MICRO: ABNORMAL /LPF
NEUTROPHILS # BLD AUTO: 3.9 X10(3)/MCL (ref 2.1–9.2)
NEUTROPHILS NFR BLD AUTO: 53.6 %
NITRITE UR QL STRIP: NEGATIVE
NRBC BLD AUTO-RTO: 0 %
OPIATES UR QL SCN: NEGATIVE
PCP UR QL: NEGATIVE
PH UR STRIP: 5.5 [PH]
PH UR: 5.5 [PH] (ref 3–11)
PLATELET # BLD AUTO: 186 X10(3)/MCL (ref 130–400)
PMV BLD AUTO: 10.4 FL (ref 7.4–10.4)
POTASSIUM SERPL-SCNC: 4.1 MMOL/L (ref 3.5–5.1)
PROT SERPL-MCNC: 7.4 GM/DL (ref 6.4–8.3)
PROT UR QL STRIP: ABNORMAL
RBC # BLD AUTO: 4.2 X10(6)/MCL (ref 4.2–5.4)
RBC #/AREA URNS AUTO: ABNORMAL /HPF
SODIUM SERPL-SCNC: 139 MMOL/L (ref 136–145)
SP GR UR STRIP.AUTO: 1.04 (ref 1–1.03)
SQUAMOUS #/AREA URNS LPF: ABNORMAL /HPF
UROBILINOGEN UR STRIP-ACNC: ABNORMAL
WBC # BLD AUTO: 7.27 X10(3)/MCL (ref 4.5–11.5)
WBC #/AREA URNS AUTO: ABNORMAL /HPF

## 2025-07-08 PROCEDURE — 80053 COMPREHEN METABOLIC PANEL: CPT | Performed by: EMERGENCY MEDICINE

## 2025-07-08 PROCEDURE — 63600175 PHARM REV CODE 636 W HCPCS: Performed by: EMERGENCY MEDICINE

## 2025-07-08 PROCEDURE — 99285 EMERGENCY DEPT VISIT HI MDM: CPT | Mod: 25

## 2025-07-08 PROCEDURE — 96372 THER/PROPH/DIAG INJ SC/IM: CPT | Performed by: EMERGENCY MEDICINE

## 2025-07-08 PROCEDURE — 84703 CHORIONIC GONADOTROPIN ASSAY: CPT | Performed by: EMERGENCY MEDICINE

## 2025-07-08 PROCEDURE — 82077 ASSAY SPEC XCP UR&BREATH IA: CPT | Performed by: EMERGENCY MEDICINE

## 2025-07-08 PROCEDURE — 80143 DRUG ASSAY ACETAMINOPHEN: CPT | Performed by: EMERGENCY MEDICINE

## 2025-07-08 PROCEDURE — 80307 DRUG TEST PRSMV CHEM ANLYZR: CPT | Performed by: EMERGENCY MEDICINE

## 2025-07-08 PROCEDURE — 81001 URINALYSIS AUTO W/SCOPE: CPT | Performed by: EMERGENCY MEDICINE

## 2025-07-08 PROCEDURE — 85025 COMPLETE CBC W/AUTO DIFF WBC: CPT | Performed by: EMERGENCY MEDICINE

## 2025-07-08 RX ORDER — DIPHENHYDRAMINE HYDROCHLORIDE 50 MG/ML
50 INJECTION, SOLUTION INTRAMUSCULAR; INTRAVENOUS
Status: COMPLETED | OUTPATIENT
Start: 2025-07-08 | End: 2025-07-08

## 2025-07-08 RX ORDER — LORAZEPAM 2 MG/ML
2 INJECTION INTRAMUSCULAR
Status: COMPLETED | OUTPATIENT
Start: 2025-07-08 | End: 2025-07-08

## 2025-07-08 RX ORDER — HALOPERIDOL LACTATE 5 MG/ML
10 INJECTION, SOLUTION INTRAMUSCULAR
Status: DISCONTINUED | OUTPATIENT
Start: 2025-07-08 | End: 2025-07-08

## 2025-07-08 RX ORDER — ZIPRASIDONE MESYLATE 20 MG/ML
20 INJECTION, POWDER, LYOPHILIZED, FOR SOLUTION INTRAMUSCULAR
Status: COMPLETED | OUTPATIENT
Start: 2025-07-08 | End: 2025-07-08

## 2025-07-08 RX ORDER — HALOPERIDOL LACTATE 5 MG/ML
5 INJECTION, SOLUTION INTRAMUSCULAR
Status: COMPLETED | OUTPATIENT
Start: 2025-07-09 | End: 2025-07-08

## 2025-07-08 RX ADMIN — DIPHENHYDRAMINE HYDROCHLORIDE 50 MG: 50 INJECTION INTRAMUSCULAR; INTRAVENOUS at 11:07

## 2025-07-08 RX ADMIN — ZIPRASIDONE MESYLATE 20 MG: 20 INJECTION, POWDER, LYOPHILIZED, FOR SOLUTION INTRAMUSCULAR at 10:07

## 2025-07-08 RX ADMIN — HALOPERIDOL LACTATE 5 MG: 5 INJECTION, SOLUTION INTRAMUSCULAR at 11:07

## 2025-07-08 RX ADMIN — LORAZEPAM 2 MG: 2 INJECTION INTRAMUSCULAR; INTRAVENOUS at 11:07

## 2025-07-09 VITALS
SYSTOLIC BLOOD PRESSURE: 134 MMHG | DIASTOLIC BLOOD PRESSURE: 93 MMHG | HEART RATE: 60 BPM | TEMPERATURE: 97 F | RESPIRATION RATE: 20 BRPM | OXYGEN SATURATION: 98 %

## 2025-07-09 PROCEDURE — 96372 THER/PROPH/DIAG INJ SC/IM: CPT | Performed by: EMERGENCY MEDICINE

## 2025-07-09 PROCEDURE — 63600175 PHARM REV CODE 636 W HCPCS: Performed by: EMERGENCY MEDICINE
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
